# Patient Record
Sex: FEMALE | URBAN - METROPOLITAN AREA
[De-identification: names, ages, dates, MRNs, and addresses within clinical notes are randomized per-mention and may not be internally consistent; named-entity substitution may affect disease eponyms.]

---

## 2018-10-02 ENCOUNTER — APPOINTMENT (RX ONLY)
Dept: URBAN - METROPOLITAN AREA CLINIC 122 | Facility: CLINIC | Age: 33
Setting detail: DERMATOLOGY
End: 2018-10-02

## 2018-10-02 DIAGNOSIS — L83 ACANTHOSIS NIGRICANS: ICD-10-CM

## 2018-10-02 DIAGNOSIS — L70.0 ACNE VULGARIS: ICD-10-CM

## 2018-10-02 PROCEDURE — ? PRESCRIPTION

## 2018-10-02 PROCEDURE — 99203 OFFICE O/P NEW LOW 30 MIN: CPT

## 2018-10-02 RX ORDER — HYDROQUINONE 4 %
SMALL CREAM (GRAM) TOPICAL HS
Qty: 30 | Refills: 3 | Status: ERX | COMMUNITY
Start: 2018-10-02

## 2018-10-02 RX ORDER — CLINDAMYCIN PHOSPHATE AND TRETINOIN 10; .25 MG/G; MG/G
PEA GEL TOPICAL HS
Qty: 60 | Refills: 3 | Status: ERX | COMMUNITY
Start: 2018-10-02

## 2018-10-02 RX ADMIN — CLINDAMYCIN PHOSPHATE AND TRETINOIN PEA: 10; .25 GEL TOPICAL at 00:00

## 2018-10-02 RX ADMIN — Medication SMALL: at 00:00

## 2018-10-02 NOTE — HPI: DISCOLORATION
How Severe Is Your Skin Discoloration?: mild
Additional History: Posterior neck has darken thru the years.

## 2019-01-08 ENCOUNTER — APPOINTMENT (RX ONLY)
Dept: URBAN - METROPOLITAN AREA CLINIC 122 | Facility: CLINIC | Age: 34
Setting detail: DERMATOLOGY
End: 2019-01-08

## 2019-01-08 DIAGNOSIS — L81.0 POSTINFLAMMATORY HYPERPIGMENTATION: ICD-10-CM

## 2019-01-08 DIAGNOSIS — L83 ACANTHOSIS NIGRICANS: ICD-10-CM

## 2019-01-08 DIAGNOSIS — L70.0 ACNE VULGARIS: ICD-10-CM

## 2019-01-08 PROBLEM — F41.9 ANXIETY DISORDER, UNSPECIFIED: Status: ACTIVE | Noted: 2019-01-08

## 2019-01-08 PROBLEM — L29.8 OTHER PRURITUS: Status: ACTIVE | Noted: 2019-01-08

## 2019-01-08 PROCEDURE — ? TREATMENT REGIMEN

## 2019-01-08 PROCEDURE — 99213 OFFICE O/P EST LOW 20 MIN: CPT

## 2019-01-08 ASSESSMENT — SEVERITY ASSESSMENT OVERALL AMONG ALL PATIENTS
IN YOUR EXPERIENCE, AMONG ALL PATIENTS YOU HAVE SEEN WITH THIS CONDITION, HOW SEVERE IS THIS PATIENT'S CONDITION?: ALMOST CLEAR

## 2019-01-08 NOTE — PROCEDURE: TREATMENT REGIMEN
Initiate Treatment: where irritation on left chest may use hydroquinone for fading brown
Detail Level: Zone
Continue Regimen: veltin to lower face at night
Continue Regimen: hydroquinone, advised hard to fade this out

## 2020-08-20 ENCOUNTER — APPOINTMENT (RX ONLY)
Dept: URBAN - METROPOLITAN AREA CLINIC 83 | Facility: CLINIC | Age: 35
Setting detail: DERMATOLOGY
End: 2020-08-20

## 2020-08-20 DIAGNOSIS — L21.8 OTHER SEBORRHEIC DERMATITIS: ICD-10-CM

## 2020-08-20 DIAGNOSIS — L20.89 OTHER ATOPIC DERMATITIS: ICD-10-CM

## 2020-08-20 DIAGNOSIS — L70.0 ACNE VULGARIS: ICD-10-CM

## 2020-08-20 PROBLEM — L20.84 INTRINSIC (ALLERGIC) ECZEMA: Status: ACTIVE | Noted: 2020-08-20

## 2020-08-20 PROCEDURE — 99213 OFFICE O/P EST LOW 20 MIN: CPT

## 2020-08-20 PROCEDURE — ? TREATMENT REGIMEN

## 2020-08-20 PROCEDURE — ? COUNSELING

## 2020-08-20 PROCEDURE — ? PRESCRIPTION

## 2020-08-20 RX ORDER — CLINDAMYCIN PHOSPHATE AND TRETINOIN 10; .25 MG/G; MG/G
PEA GEL TOPICAL HS
Qty: 60 | Refills: 5 | Status: ERX | COMMUNITY
Start: 2020-08-20

## 2020-08-20 RX ORDER — FLUOCINONIDE 0.5 MG/ML
FEW DROPS SOLUTION TOPICAL HS
Qty: 60 | Refills: 5 | Status: ERX | COMMUNITY
Start: 2020-08-20

## 2020-08-20 RX ADMIN — FLUOCINONIDE FEW DROPS: 0.5 SOLUTION TOPICAL at 00:00

## 2020-08-20 RX ADMIN — CLINDAMYCIN PHOSPHATE AND TRETINOIN PEA: 10; .25 GEL TOPICAL at 00:00

## 2020-08-20 NOTE — HPI: SECONDARY COMPLAINT
How Severe Is This Condition?: mild
Additional History: Discoloration on right bicep and left hip present for 7 months.

## 2020-08-20 NOTE — HPI: RASH
What Type Of Note Output Would You Prefer (Optional)?: Standard Output
Is The Patient Presenting As Previously Scheduled?: Yes
How Severe Is Your Rash?: mild
Is This A New Presentation, Or A Follow-Up?: Rash
Additional History: Recurring flaking and itching on the scalp present for years applying tar shampoo.

## 2020-08-21 RX ORDER — FLUOCINONIDE 0.5 MG/ML
SOLUTION TOPICAL HS
Qty: 60 | Refills: 5 | Status: ERX

## 2020-10-19 ENCOUNTER — APPOINTMENT (RX ONLY)
Dept: URBAN - METROPOLITAN AREA CLINIC 83 | Facility: CLINIC | Age: 35
Setting detail: DERMATOLOGY
End: 2020-10-19

## 2020-10-19 DIAGNOSIS — L70.0 ACNE VULGARIS: ICD-10-CM | Status: IMPROVED

## 2020-10-19 DIAGNOSIS — L72.8 OTHER FOLLICULAR CYSTS OF THE SKIN AND SUBCUTANEOUS TISSUE: ICD-10-CM

## 2020-10-19 DIAGNOSIS — L21.8 OTHER SEBORRHEIC DERMATITIS: ICD-10-CM

## 2020-10-19 PROCEDURE — 99213 OFFICE O/P EST LOW 20 MIN: CPT | Mod: 25

## 2020-10-19 PROCEDURE — ? INTRALESIONAL KENALOG

## 2020-10-19 PROCEDURE — ? TREATMENT REGIMEN

## 2020-10-19 PROCEDURE — 11900 INJECT SKIN LESIONS </W 7: CPT

## 2020-10-19 ASSESSMENT — LOCATION DETAILED DESCRIPTION DERM: LOCATION DETAILED: RIGHT INFERIOR VERMILION LIP

## 2020-10-19 ASSESSMENT — LOCATION ZONE DERM: LOCATION ZONE: LIP

## 2020-10-19 ASSESSMENT — LOCATION SIMPLE DESCRIPTION DERM: LOCATION SIMPLE: RIGHT LIP

## 2020-10-19 NOTE — HPI: SECONDARY COMPLAINT
How Severe Is This Condition?: moderate
Additional History: Patient states she has a lesion on her bottom lip. Patient states the area is filling up with fluid. Patient states it’s bothering her.

## 2020-10-19 NOTE — PROCEDURE: TREATMENT REGIMEN
Continue Regimen: Veltin
Detail Level: Simple
Plan: Get fluocinonide sol from [facility],never got it
Samples Given: Seysara 100 mg daily #6

## 2020-10-19 NOTE — PROCEDURE: INTRALESIONAL KENALOG
Include Z78.9 (Other Specified Conditions Influencing Health Status) As An Associated Diagnosis?: No
Detail Level: Detailed
Kenalog Preparation: Kenalog
Medical Necessity Clause: This procedure was medically necessary because the lesions that were treated were:
X Size Of Lesion In Cm (Optional): 0
Total Volume Injected (Ccs- Only Use Numbers And Decimals): .25
Concentration Of Solution Injected (Mg/Ml): 2.5
Consent: The risks of atrophy were reviewed with the patient.